# Patient Record
Sex: FEMALE | URBAN - METROPOLITAN AREA
[De-identification: names, ages, dates, MRNs, and addresses within clinical notes are randomized per-mention and may not be internally consistent; named-entity substitution may affect disease eponyms.]

---

## 2018-10-03 ENCOUNTER — OFFICE VISIT (OUTPATIENT)
Dept: URBAN - METROPOLITAN AREA CLINIC 76 | Facility: CLINIC | Age: 53
End: 2018-10-03
Payer: COMMERCIAL

## 2018-10-03 DIAGNOSIS — H52.4 PRESBYOPIA: Primary | ICD-10-CM

## 2018-10-03 DIAGNOSIS — H25.13 AGE-RELATED NUCLEAR CATARACT, BILATERAL: ICD-10-CM

## 2018-10-03 PROCEDURE — 92014 COMPRE OPH EXAM EST PT 1/>: CPT | Performed by: OPTOMETRIST

## 2018-10-03 ASSESSMENT — INTRAOCULAR PRESSURE
OD: 15
OS: 16

## 2018-10-03 ASSESSMENT — VISUAL ACUITY
OD: 20/20
OS: 20/20

## 2018-10-03 ASSESSMENT — KERATOMETRY
OS: 43.38
OD: 43.38

## 2018-11-14 ENCOUNTER — TESTING ONLY (OUTPATIENT)
Dept: URBAN - METROPOLITAN AREA CLINIC 76 | Facility: CLINIC | Age: 53
End: 2018-11-14

## 2018-11-14 PROCEDURE — V2799 MISC VISION ITEM OR SERVICE: HCPCS | Performed by: OPTOMETRIST

## 2019-12-04 ENCOUNTER — OFFICE VISIT (OUTPATIENT)
Dept: URBAN - METROPOLITAN AREA CLINIC 81 | Facility: CLINIC | Age: 54
End: 2019-12-04
Payer: COMMERCIAL

## 2019-12-04 DIAGNOSIS — H04.123 DRY EYE SYNDROME OF BILATERAL LACRIMAL GLANDS: ICD-10-CM

## 2019-12-04 DIAGNOSIS — H25.813 COMBINED FORMS OF AGE-RELATED CATARACT, BILATERAL: ICD-10-CM

## 2019-12-04 PROCEDURE — 92014 COMPRE OPH EXAM EST PT 1/>: CPT | Performed by: OPTOMETRIST

## 2019-12-04 ASSESSMENT — VISUAL ACUITY
OS: 20/20
OD: 20/25

## 2019-12-04 ASSESSMENT — INTRAOCULAR PRESSURE
OS: 16
OD: 17

## 2019-12-04 ASSESSMENT — KERATOMETRY
OS: 43.38
OD: 43.50

## 2019-12-04 NOTE — IMPRESSION/PLAN
Impression: Dry eye syndrome of bilateral lacrimal glands: H04.123 OU. Plan: Recommend OTC artifical tear use 2-4x daily w/ emphasis on QAM and QHS doses. Discussed possible improvement with thicker gel or ointment QHS. Recommend warm compress w/ lid massage. If symptoms continue/worsen will consider prescription drug therapy.

## 2022-08-09 NOTE — IMPRESSION/PLAN
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 Likely cause of glare. Plan: Discussed in detail. No treatment recommended at this time, continue to monitor.

## 2022-08-09 NOTE — IMPRESSION/PLAN
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 Plan: Discussed. Reviewed S&S of RD. Call immediately with symptoms. Pt to call with concerns or decrease in vision.